# Patient Record
Sex: MALE | Race: WHITE | HISPANIC OR LATINO | Employment: FULL TIME | URBAN - METROPOLITAN AREA
[De-identification: names, ages, dates, MRNs, and addresses within clinical notes are randomized per-mention and may not be internally consistent; named-entity substitution may affect disease eponyms.]

---

## 2018-09-05 ENCOUNTER — APPOINTMENT (OUTPATIENT)
Dept: RADIOLOGY | Facility: CLINIC | Age: 38
End: 2018-09-05

## 2018-09-05 ENCOUNTER — OFFICE VISIT (OUTPATIENT)
Dept: URGENT CARE | Facility: CLINIC | Age: 38
End: 2018-09-05

## 2018-09-05 VITALS
SYSTOLIC BLOOD PRESSURE: 134 MMHG | OXYGEN SATURATION: 98 % | HEART RATE: 67 BPM | TEMPERATURE: 98.9 F | HEIGHT: 69 IN | RESPIRATION RATE: 18 BRPM | DIASTOLIC BLOOD PRESSURE: 66 MMHG | BODY MASS INDEX: 24.44 KG/M2 | WEIGHT: 165 LBS

## 2018-09-05 DIAGNOSIS — S46.912A SHOULDER STRAIN, LEFT, INITIAL ENCOUNTER: ICD-10-CM

## 2018-09-05 DIAGNOSIS — M25.512 ACUTE PAIN OF LEFT SHOULDER: Primary | ICD-10-CM

## 2018-09-05 DIAGNOSIS — M25.512 ACUTE PAIN OF LEFT SHOULDER: ICD-10-CM

## 2018-09-05 PROCEDURE — 73030 X-RAY EXAM OF SHOULDER: CPT

## 2018-09-05 PROCEDURE — G0382 LEV 3 HOSP TYPE B ED VISIT: HCPCS | Performed by: EMERGENCY MEDICINE

## 2018-09-05 RX ORDER — ALBUTEROL SULFATE 90 UG/1
2 AEROSOL, METERED RESPIRATORY (INHALATION) EVERY 6 HOURS PRN
COMMUNITY

## 2018-09-05 RX ORDER — CYCLOBENZAPRINE HCL 10 MG
10 TABLET ORAL 3 TIMES DAILY PRN
Qty: 30 TABLET | Refills: 0 | Status: SHIPPED | OUTPATIENT
Start: 2018-09-05 | End: 2022-03-10

## 2018-09-05 RX ORDER — PREDNISONE 10 MG/1
TABLET ORAL
Qty: 27 TABLET | Refills: 0 | Status: SHIPPED | OUTPATIENT
Start: 2018-09-05

## 2018-09-05 NOTE — PATIENT INSTRUCTIONS
Gentle stretching as demonstrated, gradually progress stretching frequency and intensity  Use heat as discussed before stretching but may also use ice for reducing inflammation in between stretching sections  Rotator Cuff Injury   WHAT YOU NEED TO KNOW:   A rotator cuff injury is damage to the muscles or tendons of your rotator cuff  The rotator cuff is made up of a group of muscles and tendons that hold the shoulder joint in place  The damage may include stretching of your muscle or tears in the tendons  It may also include inflammation of the bursa (small sack of fluid around the joint)  DISCHARGE INSTRUCTIONS:   Medicines:   · Acetaminophen: This medicine decreases pain  Acetaminophen is available without a doctor's order  Ask how much to take and how often to take it  Follow directions  Acetaminophen can cause liver damage if not taken correctly  · NSAIDs:  These medicines decrease swelling, pain, and fever  NSAIDs are available without a doctor's order  Ask your healthcare provider which medicine is right for you  Ask how much to take and when to take it  Take as directed  NSAIDs can cause stomach bleeding or kidney problems if not taken correctly  · Pain medicine: You may be given a prescription medicine to decrease pain  Do not wait until the pain is severe before you take this medicine  · Steroids: This medicine may be injected into the rotator cuff area to decrease inflammation and pain  · Take your medicine as directed  Contact your healthcare provider if you think your medicine is not helping or if you have side effects  Tell him or her if you are allergic to any medicine  Keep a list of the medicines, vitamins, and herbs you take  Include the amounts, and when and why you take them  Bring the list or the pill bottles to follow-up visits  Carry your medicine list with you in case of an emergency    Follow up with your healthcare provider or orthopedist as directed:  Write down your questions so you remember to ask them during your visits  Physical therapy:  A physical therapist can teach you exercises to help improve movement and strength, and to decrease pain  These exercises may help you go back to your usual activities or return to playing sports  Self-care:   · Rest:  Rest may help your shoulder heal  Overuse of your shoulder can make your injury worse  Avoid heavy lifting, using your arms over your head, or any other activity that makes the pain worse  · Put ice or heat on your shoulder:  Use ice on your shoulder every few hours for the first several days  This may help decrease pain and swelling  After the first several days, a heating pad may help relax the muscles in your shoulder  Contact your healthcare provider or orthopedist if:   · The pain in your shoulder or arm is not improving, or is worse than before you started treatment  · You have new pain in your neck  · You have a fever  · You have questions or concerns about your condition or care  Seek care immediately or call 911 if:  · You suddenly cannot move your arm  · You have severe stomach or back pain, are vomiting blood, or have black bowel movements  © 2017 [facility] Information is for End User's use only and may not be sold, redistributed or otherwise used for commercial purposes  All illustrations and images included in CareNotes® are the copyrighted property of [employer] D A M [employer] Inc  or Andrea Jiménez  The above information is an  only  It is not intended as medical advice for individual conditions or treatments  Talk to your doctor, nurse or pharmacist before following any medical regimen to see if it is safe and effective for you

## 2018-09-05 NOTE — PROGRESS NOTES
[facility]        NAME: Tiffany Mae is a 44 y o  male  : 1980    MRN: [id number]  DATE: 2018  TIME: 12:23 PM    Assessment and Plan   Acute pain of left shoulder [M25 512]  1  Acute pain of left shoulder  XR shoulder 2+ vw left   2  Shoulder strain, left, initial encounter  cyclobenzaprine (FLEXERIL) 10 mg tablet    predniSONE 10 mg tablet    Ambulatory referral to Physical Therapy         Patient Instructions     Patient Instructions     Gentle stretching as demonstrated, gradually progress stretching frequency and intensity  Use heat as discussed before stretching but may also use ice for reducing inflammation in between stretching sections  Rotator Cuff Injury   WHAT YOU NEED TO KNOW:   A rotator cuff injury is damage to the muscles or tendons of your rotator cuff  The rotator cuff is made up of a group of muscles and tendons that hold the shoulder joint in place  The damage may include stretching of your muscle or tears in the tendons  It may also include inflammation of the bursa (small sack of fluid around the joint)  DISCHARGE INSTRUCTIONS:   Medicines:   · Acetaminophen: This medicine decreases pain  Acetaminophen is available without a doctor's order  Ask how much to take and how often to take it  Follow directions  Acetaminophen can cause liver damage if not taken correctly  · NSAIDs:  These medicines decrease swelling, pain, and fever  NSAIDs are available without a doctor's order  Ask your healthcare provider which medicine is right for you  Ask how much to take and when to take it  Take as directed  NSAIDs can cause stomach bleeding or kidney problems if not taken correctly  · Pain medicine: You may be given a prescription medicine to decrease pain  Do not wait until the pain is severe before you take this medicine  · Steroids: This medicine may be injected into the rotator cuff area to decrease inflammation and pain      · Take your medicine as directed  Contact your healthcare provider if you think your medicine is not helping or if you have side effects  Tell him or her if you are allergic to any medicine  Keep a list of the medicines, vitamins, and herbs you take  Include the amounts, and when and why you take them  Bring the list or the pill bottles to follow-up visits  Carry your medicine list with you in case of an emergency  Follow up with your healthcare provider or orthopedist as directed:  Write down your questions so you remember to ask them during your visits  Physical therapy:  A physical therapist can teach you exercises to help improve movement and strength, and to decrease pain  These exercises may help you go back to your usual activities or return to playing sports  Self-care:   · Rest:  Rest may help your shoulder heal  Overuse of your shoulder can make your injury worse  Avoid heavy lifting, using your arms over your head, or any other activity that makes the pain worse  · Put ice or heat on your shoulder:  Use ice on your shoulder every few hours for the first several days  This may help decrease pain and swelling  After the first several days, a heating pad may help relax the muscles in your shoulder  Contact your healthcare provider or orthopedist if:   · The pain in your shoulder or arm is not improving, or is worse than before you started treatment  · You have new pain in your neck  · You have a fever  · You have questions or concerns about your condition or care  Seek care immediately or call 911 if:  · You suddenly cannot move your arm  · You have severe stomach or back pain, are vomiting blood, or have black bowel movements  © 2017 [facility] [facility] Information is for End User's use only and may not be sold, redistributed or otherwise used for commercial purposes  All illustrations and images included in CareNotes® are the copyrighted property of [employer]  or Andrea Jiménez    The above information is an  only  It is not intended as medical advice for individual conditions or treatments  Talk to your doctor, nurse or pharmacist before following any medical regimen to see if it is safe and effective for you  Follow up with PCP in 3-5 days  Proceed to  ER if symptoms worsen  Chief Complaint     Chief Complaint   Patient presents with    Arm Pain     c/o l arm pain  believes he slept on it wrong  History of Present Illness       He complains of awakening with pain and weakness in his left neck, arm and shoulder this morning  He denies any unusual activity yesterday denies trauma in recent days to his neck or shoulder  He admits to similar but less severe symptoms in the remote past   He believes he slept wrong on his left arm  Review of Systems   Review of Systems   Constitutional: Negative for chills and fever  Musculoskeletal: Positive for gait problem  Negative for arthralgias and joint swelling  Skin: Negative for color change, rash and wound  Neurological: Negative for numbness           Current Medications       Current Outpatient Prescriptions:     Acetaminophen (TYLENOL ARTHRITIS PAIN PO), Take by mouth, Disp: , Rfl:     albuterol (PROVENTIL HFA,VENTOLIN HFA) 90 mcg/act inhaler, Inhale 2 puffs every 6 (six) hours as needed for wheezing, Disp: , Rfl:     cyclobenzaprine (FLEXERIL) 10 mg tablet, Take 1 tablet (10 mg total) by mouth 3 (three) times a day as needed for muscle spasms, Disp: 30 tablet, Rfl: 0    predniSONE 10 mg tablet, Take once daily all days pills on this schedule 6- 6- 5- 4- 3- 2- 1, Disp: 27 tablet, Rfl: 0    Current Allergies     Allergies as of 09/05/2018 - Reviewed 09/05/2018   Allergen Reaction Noted    Cat hair extract  09/05/2018            The following portions of the patient's history were reviewed and updated as appropriate: allergies, current medications, past family history, past medical history, past social history, past surgical history and problem list      Past Medical History:   Diagnosis Date    Asthma     Heat stroke        Past Surgical History:   Procedure Laterality Date    ANKLE SURGERY Left        No family history on file  Medications have been verified  Objective   /66   Pulse 67   Temp 98 9 °F (37 2 °C) (Tympanic)   Resp 18   Ht 5' 9" (1 753 m)   Wt 74 8 kg (165 lb)   SpO2 98%   BMI 24 37 kg/m²        Physical Exam     Physical Exam   Constitutional: He is oriented to person, place, and time  He appears well-developed and well-nourished  No distress  Neck: Neck supple  Cardiovascular: Normal rate and regular rhythm  Pulmonary/Chest: Effort normal and breath sounds normal    Musculoskeletal: He exhibits tenderness  Tender at paravertebral muscles left C-spine, flexion left shoulder to 90° limited due to pain abduction left shoulder 90° limited due to pain internal and external rotation full but painful tender at biceps tendon origin left and tender as supraspinatus insertion left   Neurological: He is alert and oriented to person, place, and time  Skin: Skin is warm and dry  No rash noted  No erythema  Nursing note and vitals reviewed

## 2021-05-11 ENCOUNTER — OFFICE VISIT (OUTPATIENT)
Dept: URGENT CARE | Facility: CLINIC | Age: 41
End: 2021-05-11
Payer: COMMERCIAL

## 2021-05-11 VITALS
HEART RATE: 78 BPM | HEIGHT: 69 IN | BODY MASS INDEX: 25.18 KG/M2 | WEIGHT: 170 LBS | SYSTOLIC BLOOD PRESSURE: 116 MMHG | OXYGEN SATURATION: 97 % | DIASTOLIC BLOOD PRESSURE: 58 MMHG | RESPIRATION RATE: 18 BRPM

## 2021-05-11 DIAGNOSIS — M54.41 ACUTE RIGHT-SIDED LOW BACK PAIN WITH RIGHT-SIDED SCIATICA: Primary | ICD-10-CM

## 2021-05-11 PROCEDURE — G0382 LEV 3 HOSP TYPE B ED VISIT: HCPCS | Performed by: PHYSICIAN ASSISTANT

## 2021-05-11 RX ORDER — PREDNISONE 50 MG/1
50 TABLET ORAL DAILY
Qty: 5 TABLET | Refills: 0 | Status: SHIPPED | OUTPATIENT
Start: 2021-05-11 | End: 2021-05-16

## 2021-05-11 RX ORDER — NAPROXEN 500 MG/1
500 TABLET ORAL 2 TIMES DAILY WITH MEALS
Qty: 30 TABLET | Refills: 0 | Status: SHIPPED | OUTPATIENT
Start: 2021-05-11 | End: 2022-03-10

## 2021-05-11 RX ORDER — METHOCARBAMOL 750 MG/1
750 TABLET, FILM COATED ORAL EVERY 6 HOURS PRN
Qty: 15 TABLET | Refills: 0 | Status: SHIPPED | OUTPATIENT
Start: 2021-05-11 | End: 2022-03-10

## 2021-05-11 NOTE — PROGRESS NOTES
[facility]        NAME: Serafin Collazo is a 38 y o  male  : 1980    MRN: [id number]  DATE: May 11, 2021  TIME: 9:22 AM    Assessment and Plan   Acute right-sided low back pain with right-sided sciatica [M54 41]  1  Acute right-sided low back pain with right-sided sciatica  predniSONE 50 mg tablet    methocarbamol (ROBAXIN) 750 mg tablet    naproxen (NAPROSYN) 500 mg tablet    Ambulatory Referral to [facility]         Patient Instructions      take prednisone as directed until completed   use additional medications as directed for pain relief   May use additional Tylenol as needed for pain relief   follow-up with comprehensive spine as needed  Follow up with PCP in 3-5 days  Proceed to  ER if symptoms worsen  Chief Complaint     Chief Complaint   Patient presents with    Back Pain     pain starting at his lower back and going down his right leg started          History of Present Illness        21-year-old male presents with low back pain  Patient reports symptoms started on  has been waxing waning since then  No injuries reported  Has had similar issues before with low back pain that radiated into his leg  Denies any traumas to the area  Patient reports that he is woke up on  morning with low back pain it started radiating down into his right leg  Denies any bowel or bladder incontinence  No abdominal pain nausea vomiting or diarrhea  No urinary frequency urgency burning with urination  No chest pain shortness of breath or cough  Denies any fevers or chills  Back Pain  This is a new problem  The current episode started in the past 7 days  The problem occurs constantly  The problem has been waxing and waning since onset  The pain is present in the lumbar spine and gluteal  The quality of the pain is described as shooting and stabbing  The pain radiates to the right thigh, right knee and right foot  The pain is moderate   The pain is the same all the time  The symptoms are aggravated by bending, position and twisting  Stiffness is present in the morning  Associated symptoms include leg pain  Pertinent negatives include no abdominal pain, bladder incontinence, bowel incontinence, chest pain, dysuria, numbness, paresis, perianal numbness, tingling or weakness  He has tried NSAIDs for the symptoms  The treatment provided no relief  Review of Systems   Review of Systems   Constitutional: Negative  HENT: Negative  Eyes: Negative  Respiratory: Negative  Cardiovascular: Negative  Negative for chest pain  Gastrointestinal: Negative  Negative for abdominal pain and bowel incontinence  Genitourinary: Negative for bladder incontinence and dysuria  Musculoskeletal: Positive for back pain  Skin: Negative  Neurological: Negative  Negative for tingling, weakness and numbness           Current Medications       Current Outpatient Medications:     Acetaminophen (TYLENOL ARTHRITIS PAIN PO), Take by mouth, Disp: , Rfl:     albuterol (PROVENTIL HFA,VENTOLIN HFA) 90 mcg/act inhaler, Inhale 2 puffs every 6 (six) hours as needed for wheezing, Disp: , Rfl:     cyclobenzaprine (FLEXERIL) 10 mg tablet, Take 1 tablet (10 mg total) by mouth 3 (three) times a day as needed for muscle spasms (Patient not taking: Reported on 5/11/2021), Disp: 30 tablet, Rfl: 0    methocarbamol (ROBAXIN) 750 mg tablet, Take 1 tablet (750 mg total) by mouth every 6 (six) hours as needed for muscle spasms, Disp: 15 tablet, Rfl: 0    naproxen (NAPROSYN) 500 mg tablet, Take 1 tablet (500 mg total) by mouth 2 (two) times a day with meals, Disp: 30 tablet, Rfl: 0    predniSONE 10 mg tablet, Take once daily all days pills on this schedule 6- 6- 5- 4- 3- 2- 1 (Patient not taking: Reported on 5/11/2021), Disp: 27 tablet, Rfl: 0    predniSONE 50 mg tablet, Take 1 tablet (50 mg total) by mouth daily for 5 days, Disp: 5 tablet, Rfl: 0    Current Allergies     Allergies as of 05/11/2021 - Reviewed 05/11/2021   Allergen Reaction Noted    Cat hair extract  09/05/2018            The following portions of the patient's history were reviewed and updated as appropriate: allergies, current medications, past family history, past medical history, past social history, past surgical history and problem list      Past Medical History:   Diagnosis Date    Asthma     Heat stroke        Past Surgical History:   Procedure Laterality Date    ANKLE SURGERY Left        History reviewed  No pertinent family history  Medications have been verified  Objective   /58   Pulse 78   Resp 18   Ht 5' 9" (1 753 m)   Wt 77 1 kg (170 lb)   SpO2 97%   BMI 25 10 kg/m²   No LMP for male patient  Physical Exam     Physical Exam  Vitals signs and nursing note reviewed  Constitutional:       General: He is not in acute distress  Appearance: He is well-developed  HENT:      Head: Normocephalic and atraumatic  Right Ear: External ear normal       Left Ear: External ear normal       Nose: Nose normal    Eyes:      General:         Right eye: No discharge  Left eye: No discharge  Conjunctiva/sclera: Conjunctivae normal    Neck:      Musculoskeletal: Normal range of motion and neck supple  Cardiovascular:      Rate and Rhythm: Normal rate and regular rhythm  Pulmonary:      Effort: Pulmonary effort is normal  No respiratory distress  Musculoskeletal: Normal range of motion  Lumbar back: He exhibits tenderness, pain and spasm  He exhibits normal range of motion, no bony tenderness, no swelling, no edema, no deformity, no laceration and normal pulse  Back:    Skin:     General: Skin is warm and dry  Neurological:      Mental Status: He is alert and oriented to person, place, and time  GCS: GCS eye subscore is 4  GCS verbal subscore is 5  GCS motor subscore is 6  Motor: Motor function is intact  Coordination: Coordination is intact  Deep Tendon Reflexes:      Reflex Scores:       Patellar reflexes are 2+ on the right side and 2+ on the left side

## 2021-05-11 NOTE — PATIENT INSTRUCTIONS
take prednisone as directed until completed   use additional medications as directed for pain relief   May use additional Tylenol as needed for pain relief   follow-up with comprehensive spine as needed  Follow up with PCP in 3-5 days  Proceed to  ER if symptoms worsen  Acute Low Back Pain   AMBULATORY CARE:   Acute low back pain  is sudden discomfort in your lower back area that lasts for up to 6 weeks  The discomfort makes it difficult to tolerate activity  Common symptoms include the following:   · Back stiffness or spasms    · Pain down the back or side of one leg    · Holding yourself in an unusual position or posture to decrease your back pain    · Not being able to find a sitting position that is comfortable    · Slow increase in your pain for 24 to 48 hours after you stress your back    · Tenderness on your lower back or severe pain when you move your back    Seek care immediately if:   · You have severe pain  · You have sudden stiffness and heaviness on both buttocks down to both legs  · You have numbness or weakness in one leg, or pain in both legs  · You have numbness in your genital area or across your lower back  · You cannot control your urine or bowel movements  Contact your healthcare provider if:   · You have a fever  · You have pain at night or when you rest     · Your pain does not get better with treatment  · You have pain that worsens when you cough or sneeze  · You suddenly feel something pop or snap in your back  · You have questions or concerns about your condition or care  The goal of treatment for acute low back pain  is to relieve your pain and help you tolerate activity  Most people with acute lower back pain get better within 4 to 6 weeks  You may need any of the following:  · NSAIDs  help decrease swelling and pain  This medicine is available with or without a doctor's order  NSAIDs can cause stomach bleeding or kidney problems in certain people   If you take blood thinner medicine, always ask your healthcare provider if NSAIDs are safe for you  Always read the medicine label and follow directions  · Acetaminophen  decreases pain and fever  It is available without a doctor's order  Ask how much to take and how often to take it  Follow directions  Read the labels of all other medicines you are using to see if they also contain acetaminophen, or ask your doctor or pharmacist  Acetaminophen can cause liver damage if not taken correctly  Do not use more than 4 grams (4,000 milligrams) total of acetaminophen in one day  · Muscle relaxers  decrease pain by relaxing the muscles in your lower spine  · Prescription pain medicine  may be given  Ask your healthcare provider how to take this medicine safely  Some prescription pain medicines contain acetaminophen  Do not take other medicines that contain acetaminophen without talking to your healthcare provider  Too much acetaminophen may cause liver damage  Prescription pain medicine may cause constipation  Ask your healthcare provider how to prevent or treat constipation  Manage your symptoms:   · Stay active  as much as you can without causing more pain  Bed rest could make your back pain worse  Start with some light exercises such as walking  Avoid heavy lifting until your pain is gone  Ask for more information about the activities or exercises that are right for you  · Apply ice  on your back for 15 to 20 minutes every hour or as directed  Use an ice pack, or put crushed ice in a plastic bag  Cover it with a towel before you apply it to your skin  Ice helps prevent tissue damage and decreases swelling and pain  · Apply heat  on your back for 20 to 30 minutes every 2 hours for as many days as directed  Heat helps decrease pain and muscle spasms  Alternate heat and ice  Prevent acute low back pain:   · Use proper body mechanics  ? Bend at the hips and knees when you  objects   Do not bend from the waist  Use your leg muscles as you lift the load  Do not use your back  Keep the object close to your chest as you lift it  Try not to twist or lift anything above your waist     ? Change your position often when you stand for long periods of time  Rest one foot on a small box or footrest, and then switch to the other foot often  ? Try not to sit for long periods of time  When you do, sit in a straight-backed chair with your feet flat on the floor  Never reach, pull, or push while you are sitting  · Do exercises that strengthen your back muscles  Warm up before you exercise  Ask your healthcare provider the best exercises for you  · Maintain a healthy weight  Ask your healthcare provider how much you should weigh  Ask him to help you create a weight loss plan if you are overweight  Follow up with your healthcare provider as directed:  Return for a follow-up visit if you still have pain after 1 to 3 weeks of treatment  You may need to visit an orthopedist if your back pain lasts longer than 12 weeks  Write down your questions so you remember to ask them during your visits  © Copyright [address] Information is for End User's use only and may not be sold, redistributed or otherwise used for commercial purposes  All illustrations and images included in CareNotes® are the copyrighted property of A D A M , Inc  or [facility] Taye Mcdermott   The above information is an  only  It is not intended as medical advice for individual conditions or treatments  Talk to your doctor, nurse or pharmacist before following any medical regimen to see if it is safe and effective for you

## 2021-05-11 NOTE — LETTER
May 11, 2021     Patient: Gerardo Padgett   YOB: 1980   Date of Visit: 5/11/2021       To Whom it May Concern:    Gerardo Padgett was seen in my clinic on 5/11/2021  He may return to work on 05/14/2021  Patient may return sooner if symptoms have improved  If you have any questions or concerns, please don't hesitate to call  Sincerely,          Washington German PA-C        CC: No Recipients

## 2021-05-12 ENCOUNTER — TELEPHONE (OUTPATIENT)
Dept: PHYSICAL THERAPY | Facility: OTHER | Age: 41
End: 2021-05-12

## 2021-05-12 NOTE — TELEPHONE ENCOUNTER
Call placed to the patient per [facility] referral     Voice message left for patient to call back  Phone number and hours of business provided  This is the 1st attempt to reach the patient    Will defer per protocol     ? H I   ? [facility]

## 2022-03-10 ENCOUNTER — OFFICE VISIT (OUTPATIENT)
Dept: URGENT CARE | Facility: CLINIC | Age: 42
End: 2022-03-10
Payer: COMMERCIAL

## 2022-03-10 VITALS
SYSTOLIC BLOOD PRESSURE: 124 MMHG | OXYGEN SATURATION: 99 % | HEART RATE: 72 BPM | TEMPERATURE: 98 F | RESPIRATION RATE: 16 BRPM | DIASTOLIC BLOOD PRESSURE: 74 MMHG

## 2022-03-10 DIAGNOSIS — G89.29 CHRONIC MIDLINE LOW BACK PAIN WITHOUT SCIATICA: Primary | ICD-10-CM

## 2022-03-10 DIAGNOSIS — M54.50 CHRONIC MIDLINE LOW BACK PAIN WITHOUT SCIATICA: Primary | ICD-10-CM

## 2022-03-10 PROCEDURE — G0382 LEV 3 HOSP TYPE B ED VISIT: HCPCS | Performed by: PHYSICIAN ASSISTANT

## 2022-03-10 RX ORDER — IBUPROFEN 600 MG/1
600 TABLET ORAL EVERY 6 HOURS PRN
COMMUNITY

## 2022-03-10 RX ORDER — KETOROLAC TROMETHAMINE 30 MG/ML
60 INJECTION, SOLUTION INTRAMUSCULAR; INTRAVENOUS ONCE
Status: DISCONTINUED | OUTPATIENT
Start: 2022-03-10 | End: 2022-03-10

## 2022-03-10 RX ORDER — METHOCARBAMOL 750 MG/1
750 TABLET, FILM COATED ORAL EVERY 6 HOURS PRN
Qty: 30 TABLET | Refills: 0 | Status: SHIPPED | OUTPATIENT
Start: 2022-03-10

## 2022-03-10 RX ORDER — IBUPROFEN 400 MG/1
800 TABLET ORAL ONCE
Status: COMPLETED | OUTPATIENT
Start: 2022-03-10 | End: 2022-03-10

## 2022-03-10 RX ADMIN — IBUPROFEN 800 MG: 400 TABLET ORAL at 11:03

## 2022-03-10 NOTE — PATIENT INSTRUCTIONS
Full labs done 7/2021 from Dr. Lesly Ramirez  Please advise if further labs needed prior to OV Medications as prescribed  Alternate ice and heat  Gentle range-of-motion stretching  Remain active  Make appoint with comprehensive spine program   Follow-up with family doctor in 3-5 days  Go to ER symptoms become severe  Back Pain   WHAT YOU NEED TO KNOW:   Back pain is common  You may have back pain and muscle spasms  You may feel sore or stiff on one or both sides of your back  The pain may spread to your lower body  Conditions that affect the spine, joints, or muscles can cause back pain  These may include arthritis, spinal stenosis (narrowing of the spinal column), muscle tension, or breakdown of the spinal discs  DISCHARGE INSTRUCTIONS:   Call your local emergency number (911 in the [address] [facility]) if:   · You have severe back pain with chest pain  · You cannot control your urine or bowel movements  · Your pain becomes so severe that you cannot walk  Return to the emergency department if:   · You have pain, numbness, or weakness in one or both legs  · You have severe back pain, nausea, and vomiting  · You have severe back pain that spreads to your side or genital area  Call your doctor if:   · You have back pain that does not get better with rest and pain medicine  · You have a fever  · You have pain that worsens when you are on your back or when you rest     · You have pain that worsens when you cough or sneeze  · You lose weight without trying  · You have questions or concerns about your condition or care  Medicines: You may need any of the following:  · NSAIDs , such as ibuprofen, help decrease swelling, pain, and fever  This medicine is available with or without a doctor's order  NSAIDs can cause stomach bleeding or kidney problems in certain people  If you take blood thinner medicine, always ask your healthcare provider if NSAIDs are safe for you  Always read the medicine label and follow directions  · Acetaminophen  decreases pain and fever   It is available without a doctor's order  Ask how much to take and how often to take it  Follow directions  Read the labels of all other medicines you are using to see if they also contain acetaminophen, or ask your doctor or pharmacist  Acetaminophen can cause liver damage if not taken correctly  Do not use more than 4 grams (4,000 milligrams) total of acetaminophen in one day  · Muscle relaxers  help decrease muscle spasms and back pain  · Prescription pain medicine  may be given  Ask your healthcare provider how to take this medicine safely  Some prescription pain medicines contain acetaminophen  Do not take other medicines that contain acetaminophen without talking to your healthcare provider  Too much acetaminophen may cause liver damage  Prescription pain medicine may cause constipation  Ask your healthcare provider how to prevent or treat constipation  · Take your medicine as directed  Contact your healthcare provider if you think your medicine is not helping or if you have side effects  Tell him or her if you are allergic to any medicine  Keep a list of the medicines, vitamins, and herbs you take  Include the amounts, and when and why you take them  Bring the list or the pill bottles to follow-up visits  Carry your medicine list with you in case of an emergency  How to manage your back pain:   · Apply ice  on your back for 15 to 20 minutes every hour or as directed  Use an ice pack, or put crushed ice in a plastic bag  Cover it with a towel before you apply it to your skin  Ice helps prevent tissue damage and decreases pain  · Apply heat  on your back for 20 to 30 minutes every 2 hours for as many days as directed  Heat helps decrease pain and muscle spasms  · Stay active  as much as you can without causing more pain  Bed rest could make your back pain worse  Avoid heavy lifting until your pain is gone  · Go to physical therapy as directed    A physical therapist can teach you exercises to help improve movement and strength, and to decrease pain  Follow up with your doctor in 2 weeks, or as directed: You might need to see a specialist  Write down your questions so you remember to ask them during your visits  © Copyright [employer] 2022 Information is for End User's use only and may not be sold, redistributed or otherwise used for commercial purposes  All illustrations and images included in CareNotes® are the copyrighted property of A D A M [employer] Inc  or [employer]  The above information is an  only  It is not intended as medical advice for individual conditions or treatments  Talk to your doctor, nurse or pharmacist before following any medical regimen to see if it is safe and effective for you

## 2022-03-10 NOTE — LETTER
March 10, 2022     Patient: Rosario Das   YOB: 1980   Date of Visit: 3/10/2022       To Whom It May Concern: It is my medical opinion that Rosario Das may return to work on 3/14/2022             Sincerely,        Mariia Becerra PA-C
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
## 2022-03-11 ENCOUNTER — NURSE TRIAGE (OUTPATIENT)
Dept: PHYSICAL THERAPY | Facility: OTHER | Age: 42
End: 2022-03-11

## 2022-03-11 DIAGNOSIS — G89.29 CHRONIC MIDLINE LOW BACK PAIN WITHOUT SCIATICA: Primary | ICD-10-CM

## 2022-03-11 DIAGNOSIS — M54.50 CHRONIC MIDLINE LOW BACK PAIN WITHOUT SCIATICA: Primary | ICD-10-CM

## 2022-12-10 ENCOUNTER — OFFICE VISIT (OUTPATIENT)
Dept: URGENT CARE | Facility: CLINIC | Age: 42
End: 2022-12-10

## 2022-12-10 VITALS
SYSTOLIC BLOOD PRESSURE: 135 MMHG | TEMPERATURE: 97.1 F | HEART RATE: 92 BPM | WEIGHT: 175 LBS | HEIGHT: 69 IN | DIASTOLIC BLOOD PRESSURE: 67 MMHG | RESPIRATION RATE: 20 BRPM | BODY MASS INDEX: 25.92 KG/M2 | OXYGEN SATURATION: 99 %

## 2022-12-10 DIAGNOSIS — G89.29 CHRONIC MIDLINE LOW BACK PAIN WITHOUT SCIATICA: ICD-10-CM

## 2022-12-10 DIAGNOSIS — M54.50 CHRONIC MIDLINE LOW BACK PAIN WITHOUT SCIATICA: ICD-10-CM

## 2022-12-10 RX ORDER — METHOCARBAMOL 750 MG/1
750 TABLET, FILM COATED ORAL EVERY 6 HOURS PRN
Qty: 30 TABLET | Refills: 0 | Status: SHIPPED | OUTPATIENT
Start: 2022-12-10

## 2022-12-10 RX ORDER — DICLOFENAC SODIUM 75 MG/1
75 TABLET, DELAYED RELEASE ORAL 2 TIMES DAILY
Qty: 30 TABLET | Refills: 0 | Status: SHIPPED | OUTPATIENT
Start: 2022-12-10
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
## 2022-12-10 NOTE — LETTER
December 10, 2022     Patient: Sharita Echeverria  YOB: 1980  Date of Visit: 12/10/2022      To Whom it May Concern:    Sharita Echeverria is under my professional care  Prieto Stuart was seen in my office on 12/10/2022  Prieto Stuart may return to work on Wednesday, December 14, 2022  If you have any questions or concerns, please don't hesitate to call           Sincerely,          Alisha Lynn DO        CC: No Recipients

## 2023-05-11 ENCOUNTER — OFFICE VISIT (OUTPATIENT)
Dept: URGENT CARE | Facility: CLINIC | Age: 43
End: 2023-05-11

## 2023-05-11 VITALS
WEIGHT: 175 LBS | HEART RATE: 87 BPM | RESPIRATION RATE: 16 BRPM | TEMPERATURE: 97.8 F | DIASTOLIC BLOOD PRESSURE: 83 MMHG | BODY MASS INDEX: 25.92 KG/M2 | HEIGHT: 69 IN | OXYGEN SATURATION: 98 % | SYSTOLIC BLOOD PRESSURE: 125 MMHG

## 2023-05-11 DIAGNOSIS — R11.11 VOMITING WITHOUT NAUSEA, UNSPECIFIED VOMITING TYPE: Primary | ICD-10-CM

## 2023-05-11 RX ORDER — ONDANSETRON 4 MG/1
4 TABLET, FILM COATED ORAL EVERY 8 HOURS PRN
Qty: 20 TABLET | Refills: 0 | Status: SHIPPED | OUTPATIENT
Start: 2023-05-11

## 2023-05-11 NOTE — LETTER
May 11, 2023     Patient: Rochelle Garcia   YOB: 1980   Date of Visit: 5/11/2023       To Whom it May Concern:    Rochelle Garcia was seen in my clinic on 5/11/2023  Please excuse from work on 5/10/2023 and 5/11/2023  If you have any questions or concerns, please don't hesitate to call           Sincerely,          RICHARD Talavera        CC: No Recipients
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
## 2024-04-26 ENCOUNTER — OFFICE VISIT (OUTPATIENT)
Dept: URGENT CARE | Facility: CLINIC | Age: 44
End: 2024-04-26
Payer: COMMERCIAL

## 2024-04-26 VITALS
SYSTOLIC BLOOD PRESSURE: 138 MMHG | HEART RATE: 97 BPM | RESPIRATION RATE: 18 BRPM | WEIGHT: 191 LBS | DIASTOLIC BLOOD PRESSURE: 80 MMHG | OXYGEN SATURATION: 95 % | TEMPERATURE: 97.9 F | BODY MASS INDEX: 28.29 KG/M2 | HEIGHT: 69 IN

## 2024-04-26 DIAGNOSIS — F41.9 ANXIETY: Primary | ICD-10-CM

## 2024-04-26 PROCEDURE — G0382 LEV 3 HOSP TYPE B ED VISIT: HCPCS

## 2024-04-26 PROCEDURE — 93005 ELECTROCARDIOGRAM TRACING: CPT

## 2024-04-26 NOTE — PROGRESS NOTES
[facility]        NAME: Adam Brian is a 43 y.o. male  : 1980    MRN: [id number]  DATE: 2024  TIME: 7:09 PM    Assessment and Plan   Anxiety [F41.9]  1. Anxiety  ECG 12 lead    Ambulatory Referral to Family Practice        Symptoms resolved at present time. EKG showing NSR.  Symptoms appear to resemble anxiety secondary to wearing respirator.  Patient looking for documentation to possibly shorten length of time wearing this.  Recommend follow-up with PCP for this, referral placed. Encouraged continued supportive measures.  Follow up with PCP in 3-5 days or proceed to emergency department for worsening symptoms.  Patient verbalized understanding of instructions given.       Patient Instructions     Patient Instructions     Referral placed to Primary Care/Family Practice  Follow up with PCP in 3-5 days.  Proceed to  ER if symptoms worsen.    If tests have been performed at [facility], our office will contact you with results if changes need to be made to the care plan discussed with you at the visit.  You can review your full results on [facility] MyChart.    Anxiety   AMBULATORY CARE:   Anxiety  is a condition that causes you to feel extremely worried or nervous. The feelings are so strong that they can cause problems with your daily activities or sleep. Anxiety may be triggered by something you fear, or it may happen without a cause. Family or work stress, smoking, caffeine, and alcohol can increase your risk for anxiety. Certain medicines or health conditions can also increase your risk. Anxiety can become a long-term condition if it is not managed or treated.  Common signs and symptoms:   Fatigue or muscle tightness    Shaking, restlessness, or irritability    Problems focusing    Trouble sleeping    Feeling jumpy, easily startled, or dizzy    Rapid heartbeat or shortness of breath    Call your local emergency number (911 in the [place]) if:   You have chest pain, tightness, or  heaviness that may spread to your shoulders, arms, jaw, neck, or back.    You think about harming yourself or someone else.    Call your therapist or doctor if:   Your symptoms get worse or do not get better with treatment.    Your anxiety keeps you from doing your regular daily activities.    You have new symptoms since your last visit.    You have questions or concerns about your condition or care.    The following resources are available at any time to help you, if needed:   Contact a suicide prevention organization:        For the [employer] Suicide and Crisis Lifeline:     Call or text [employer]     Send a chat on https://[employer]/chat     Call 0-349-644-6905 (1-800-273-TALK)    For the Suicide Hotline, call 6-884-522-9359 (9-562-UJPTCLO)    For a list of international numbers: https://save.org/find-help/international-resources/  Treatment for anxiety  depends on how severe your symptoms are. The following are common treatments for anxiety:  Cognitive behavioral therapy (CBT)  teaches you how to identify and change negative thought patterns.    Anxiety or antidepressant medicine  may help relieve or prevent anxiety. You may need to take the medicine for several weeks before you begin to feel better. Tell your healthcare provider about any side effects or problems you have with your medicine. The type or amount of medicine may need to be changed. Medicines are usually used along with therapy.    Self-care:   Talk to someone about your anxiety.  Your healthcare provider may suggest counseling. You might feel more comfortable talking with a friend or family member about your anxiety. Choose someone you know will be supportive and encouraging.    Get regular physical activity.  Physical activity can lower your stress, improve your mood, and help you sleep better. Work with your healthcare provider to develop a plan that you enjoy.         Create a regular sleep schedule.  A routine can help you relax before bed. Listen  to music, read, or do yoga. Try to go to bed and wake up at the same time every day. Sleep is important for emotional health.    Find ways to relax.  Activities such as meditation or listening to music can help you relax. Spend time with friends, or do things you enjoy.    Do activities you enjoy.  Spend time with friends, or do something fun. Choose activities you are familiar with or comfortable doing. This may help prevent anxiety.    Practice deep breathing.  Deep breathing can help you relax when you feel anxious. Focus on taking slow, deep breaths several times a day, or during an anxiety attack. Breathe in through your nose and out through your mouth. Deep breathing combined with meditation or listening to music may help you feel calmer.    Do not have caffeine.  Caffeine can make your symptoms worse. Do not have foods or drinks that are meant to increase your energy level.    Do not drink alcohol or use drugs.  Alcohol and drugs can worsen anxiety or make it hard to manage. Talk to your therapist or healthcare provider if you need help to quit.       Follow up with your therapist or doctor as directed:  Your healthcare provider will monitor your progress at follow-up visits. Your provider will also monitor your medicine if you take antidepressants and ask if the medicine is helping. Tell your provider about any side effects or problems you have with your medicine. The type or amount of medicine may need to be changed. Write down your questions so you remember to ask them during your visits.  For more information or support:   [employer] [place] [employer] Mental [employer]ness  [address]  [place] [address]  Phone: 8- [phone] - [phone]  Phone: 5- 259 - [phone]  Web Address: http://www.mimi.org  [address] Suicide and Crisis Lifeline  [address]  [place] [address]  Phone: 3- 977 - 370  Web Address: http://www.suicidepreventionlifeline.org OR https://"[employer]org/chat/    © Copyright Merative 2023  "Information is for End User's use only and may not be sold, redistributed or otherwise used for commercial purposes.  The above information is an  only. It is not intended as medical advice for individual conditions or treatments. Talk to your doctor, nurse or pharmacist before following any medical regimen to see if it is safe and effective for you.      Chief Complaint     Chief Complaint   Patient presents with    Dizziness     Dizziness with chest pain from heat and sweating on mask  Needs note to go back to work          History of Present Illness       43-year-old male with a past medical history significant for asthma presents with complaints of episode of sudden onset chest pain as well as increased anxiety, dizziness, and diaphoresis approximately 30 minutes into his shift at work after having to wear a respirator.  Patient reports similar episodes previously as feels \"restrained\" with use of respirator but normally able to remove throughout shift.  Symptoms increase when in hot environments and does have to wear respiratory for approximately 8 hours per day. Reports today symptoms lasted approximately 1 hour and have now completely resolved.  Denies personal cardiac history.  No shortness of breath or palpitations. Started new job approx 2 months ago and is not used to wearing mask.     Dizziness  Associated symptoms include chest pain. Pertinent negatives include no abdominal pain, arthralgias, chills, congestion, coughing, fever, headaches, myalgias, nausea, numbness, rash, sore throat, vomiting or weakness.       Review of Systems   Review of Systems   Constitutional:  Negative for chills and fever.   HENT:  Negative for congestion, ear discharge, ear pain, rhinorrhea and sore throat.    Eyes:  Negative for discharge and visual disturbance.   Respiratory:  Negative for cough, shortness of breath and wheezing.    Cardiovascular:  Positive for chest pain.   Gastrointestinal:  Negative for " abdominal pain, diarrhea, nausea and vomiting.   Musculoskeletal:  Negative for arthralgias and myalgias.   Skin:  Negative for rash.   Neurological:  Positive for dizziness. Negative for syncope, weakness, numbness and headaches.   Psychiatric/Behavioral:  The patient is nervous/anxious.          Current Medications       Current Outpatient Medications:     Acetaminophen (TYLENOL ARTHRITIS PAIN PO), Take by mouth (Patient not taking: Reported on 5/11/2023), Disp: , Rfl:     albuterol (PROVENTIL HFA,VENTOLIN HFA) 90 mcg/act inhaler, Inhale 2 puffs every 6 (six) hours as needed for wheezing (Patient not taking: Reported on 5/11/2023), Disp: , Rfl:     diclofenac (VOLTAREN) 75 mg EC tablet, Take 1 tablet (75 mg total) by mouth 2 (two) times a day (Patient not taking: Reported on 4/26/2024), Disp: 30 tablet, Rfl: 0    ibuprofen (MOTRIN) 600 mg tablet, Take 600 mg by mouth every 6 (six) hours as needed (Patient not taking: Reported on 5/11/2023), Disp: , Rfl:     methocarbamol (Robaxin-750) 750 mg tablet, Take 1 tablet (750 mg total) by mouth every 6 (six) hours as needed for muscle spasms (Patient not taking: Reported on 5/11/2023), Disp: 30 tablet, Rfl: 0    naproxen (NAPROSYN) 500 mg tablet, Take 1 tablet by mouth 2 (two) times a day (Patient not taking: Reported on 5/11/2023), Disp: , Rfl:     ondansetron (ZOFRAN) 4 mg tablet, Take 1 tablet (4 mg total) by mouth every 8 (eight) hours as needed for nausea or vomiting (Patient not taking: Reported on 4/26/2024), Disp: 20 tablet, Rfl: 0    predniSONE 10 mg tablet, Take once daily all days pills on this schedule 6- 6- 5- 4- 3- 2- 1 (Patient not taking: Reported on 5/11/2021), Disp: 27 tablet, Rfl: 0    Current Allergies     Allergies as of 04/26/2024 - Reviewed 04/26/2024   Allergen Reaction Noted    Cat hair extract  09/05/2018            The following portions of the patient's history were reviewed and updated as appropriate: allergies, current medications, past  "family history, past medical history, past social history, past surgical history and problem list.     Past Medical History:   Diagnosis Date    Asthma     Heat stroke     Sciatica        Past Surgical History:   Procedure Laterality Date    ANKLE SURGERY Left        Family History   Problem Relation Age of Onset    Hyperlipidemia Mother     Heart disease Father          Medications have been verified.        Objective   /80   Pulse 97   Temp 97.9 °F (36.6 °C) (Tympanic)   Resp 18   Ht 5' 9\" (1.753 m)   Wt 86.6 kg (191 lb)   SpO2 95%   BMI 28.21 kg/m²   No LMP for male patient.       Physical Exam     Physical Exam  Vitals and nursing note reviewed.   Constitutional:       General: He is not in acute distress.     Appearance: He is not toxic-appearing.   HENT:      Head: Normocephalic.      Nose: Nose normal.      Mouth/Throat:      Mouth: Mucous membranes are moist.      Pharynx: Oropharynx is clear.   Eyes:      Conjunctiva/sclera: Conjunctivae normal.   Cardiovascular:      Rate and Rhythm: Normal rate and regular rhythm.      Heart sounds: Normal heart sounds.   Pulmonary:      Effort: Pulmonary effort is normal. No respiratory distress.      Breath sounds: Normal breath sounds. No stridor. No wheezing, rhonchi or rales.   Skin:     General: Skin is warm and dry.   Neurological:      Mental Status: He is alert and oriented to person, place, and time.      GCS: GCS eye subscore is 4. GCS verbal subscore is 5. GCS motor subscore is 6.      Sensory: Sensation is intact.      Motor: Motor function is intact.      Gait: Gait is intact.   Psychiatric:         Mood and Affect: Mood normal.         Behavior: Behavior normal.                   "

## 2024-04-26 NOTE — LETTER
April 26, 2024     Patient: Adam Brian   YOB: 1980   Date of Visit: 4/26/2024       To Whom it May Concern:    Adam Brian was seen in my clinic on 4/26/2024. He may return to work on 4/27/2024 .    If you have any questions or concerns, please don't hesitate to call.         Sincerely,          RICHARD Avelar        CC: No Recipients

## 2024-04-26 NOTE — PATIENT INSTRUCTIONS
Referral placed to Primary Care/Family Practice  Follow up with PCP in 3-5 days.  Proceed to  ER if symptoms worsen.    If tests have been performed at [facility], our office will contact you with results if changes need to be made to the care plan discussed with you at the visit.  You can review your full results on [facility] MyChart.    Anxiety   AMBULATORY CARE:   Anxiety  is a condition that causes you to feel extremely worried or nervous. The feelings are so strong that they can cause problems with your daily activities or sleep. Anxiety may be triggered by something you fear, or it may happen without a cause. Family or work stress, smoking, caffeine, and alcohol can increase your risk for anxiety. Certain medicines or health conditions can also increase your risk. Anxiety can become a long-term condition if it is not managed or treated.  Common signs and symptoms:   Fatigue or muscle tightness    Shaking, restlessness, or irritability    Problems focusing    Trouble sleeping    Feeling jumpy, easily startled, or dizzy    Rapid heartbeat or shortness of breath    Call your local emergency number (911 in the [place]) if:   You have chest pain, tightness, or heaviness that may spread to your shoulders, arms, jaw, neck, or back.    You think about harming yourself or someone else.    Call your therapist or doctor if:   Your symptoms get worse or do not get better with treatment.    Your anxiety keeps you from doing your regular daily activities.    You have new symptoms since your last visit.    You have questions or concerns about your condition or care.    The following resources are available at any time to help you, if needed:   Contact a suicide prevention organization:        For the [place] Suicide and Crisis Lifeline:     Call or text [employer]     Send a chat on https://[employer].org/chat     Call 8-089-628-8345 (1-800-273-TALK)    For the Suicide Hotline, call 1-169.836.2030 (6-059-PFDQNSD)    For a list of  international numbers: https://save.org/find-help/international-resources/  Treatment for anxiety  depends on how severe your symptoms are. The following are common treatments for anxiety:  Cognitive behavioral therapy (CBT)  teaches you how to identify and change negative thought patterns.    Anxiety or antidepressant medicine  may help relieve or prevent anxiety. You may need to take the medicine for several weeks before you begin to feel better. Tell your healthcare provider about any side effects or problems you have with your medicine. The type or amount of medicine may need to be changed. Medicines are usually used along with therapy.    Self-care:   Talk to someone about your anxiety.  Your healthcare provider may suggest counseling. You might feel more comfortable talking with a friend or family member about your anxiety. Choose someone you know will be supportive and encouraging.    Get regular physical activity.  Physical activity can lower your stress, improve your mood, and help you sleep better. Work with your healthcare provider to develop a plan that you enjoy.         Create a regular sleep schedule.  A routine can help you relax before bed. Listen to music, read, or do yoga. Try to go to bed and wake up at the same time every day. Sleep is important for emotional health.    Find ways to relax.  Activities such as meditation or listening to music can help you relax. Spend time with friends, or do things you enjoy.    Do activities you enjoy.  Spend time with friends, or do something fun. Choose activities you are familiar with or comfortable doing. This may help prevent anxiety.    Practice deep breathing.  Deep breathing can help you relax when you feel anxious. Focus on taking slow, deep breaths several times a day, or during an anxiety attack. Breathe in through your nose and out through your mouth. Deep breathing combined with meditation or listening to music may help you feel calmer.    Do not  have caffeine.  Caffeine can make your symptoms worse. Do not have foods or drinks that are meant to increase your energy level.    Do not drink alcohol or use drugs.  Alcohol and drugs can worsen anxiety or make it hard to manage. Talk to your therapist or healthcare provider if you need help to quit.       Follow up with your therapist or doctor as directed:  Your healthcare provider will monitor your progress at follow-up visits. Your provider will also monitor your medicine if you take antidepressants and ask if the medicine is helping. Tell your provider about any side effects or problems you have with your medicine. The type or amount of medicine may need to be changed. Write down your questions so you remember to ask them during your visits.  For more information or support:   [facility] [place] [facility]ness  [address]  [place] [address]  Phone: 1- 029 - [phone]  Phone: 0- 079 - [phone]  Web Address: http://www.mimi.[employer]  [facility] Suicide and Crisis Lifeline  [address]  [place] [address]  Phone: 1- 398 - 452  Web Address: http://www.suicidepreventionlifeline.org OR https://[employer]/chat/    © Copyright Merative 2023 Information is for End User's use only and may not be sold, redistributed or otherwise used for commercial purposes.  The above information is an  only. It is not intended as medical advice for individual conditions or treatments. Talk to your doctor, nurse or pharmacist before following any medical regimen to see if it is safe and effective for you.

## 2024-04-27 LAB
ATRIAL RATE: 77 BPM
P AXIS: 48 DEGREES
PR INTERVAL: 132 MS
QRS AXIS: 40 DEGREES
QRSD INTERVAL: 90 MS
QT INTERVAL: 384 MS
QTC INTERVAL: 434 MS
T WAVE AXIS: 46 DEGREES
VENTRICULAR RATE: 77 BPM

## 2024-04-27 PROCEDURE — 93010 ELECTROCARDIOGRAM REPORT: CPT | Performed by: INTERNAL MEDICINE

## 2024-04-30 ENCOUNTER — OFFICE VISIT (OUTPATIENT)
Dept: FAMILY MEDICINE CLINIC | Facility: CLINIC | Age: 44
End: 2024-04-30
Payer: COMMERCIAL

## 2024-04-30 VITALS
HEART RATE: 84 BPM | WEIGHT: 198.2 LBS | DIASTOLIC BLOOD PRESSURE: 82 MMHG | OXYGEN SATURATION: 98 % | SYSTOLIC BLOOD PRESSURE: 132 MMHG | HEIGHT: 69 IN | BODY MASS INDEX: 29.36 KG/M2

## 2024-04-30 DIAGNOSIS — F41.9 ANXIETY: ICD-10-CM

## 2024-04-30 DIAGNOSIS — Z00.00 ANNUAL PHYSICAL EXAM: Primary | ICD-10-CM

## 2024-04-30 DIAGNOSIS — R07.89 CHEST TIGHTNESS: ICD-10-CM

## 2024-04-30 PROCEDURE — 99386 PREV VISIT NEW AGE 40-64: CPT | Performed by: FAMILY MEDICINE

## 2024-04-30 PROCEDURE — 93000 ELECTROCARDIOGRAM COMPLETE: CPT | Performed by: FAMILY MEDICINE

## 2024-04-30 PROCEDURE — 99214 OFFICE O/P EST MOD 30 MIN: CPT | Performed by: FAMILY MEDICINE

## 2024-04-30 RX ORDER — ESCITALOPRAM OXALATE 10 MG/1
10 TABLET ORAL DAILY
Qty: 30 TABLET | Refills: 1 | Status: SHIPPED | OUTPATIENT
Start: 2024-04-30 | End: 2024-10-27

## 2024-04-30 NOTE — LETTER
April 30, 2024     Patient: Adam Brian  YOB: 1980  Date of Visit: 4/30/2024      To Whom it May Concern:    Adam Brian is under my professional care. Adam was seen in my office on 4/30/2024. Adam may return to work on 5/1/24 . Please excuse 4/29 and 4/30    If you have any questions or concerns, please don't hesitate to call.         Sincerely,          Robert Budinetz, MD        CC: No Recipients

## 2024-04-30 NOTE — PROGRESS NOTES
Assessment/Plan:       1. Annual physical exam  Assessment & Plan:  Reviewed age-appropriate health maintenance and preventive care.        2. Chest tightness  Comments:  ekg wnl  check stress test and labs  probably anxiety induced  Orders:  -     POCT ECG  -     escitalopram (LEXAPRO) 10 mg tablet; Take 1 tablet (10 mg total) by mouth daily  -     Stress test only, exercise; Future; Expected date: 04/30/2024  -     CBC and Platelet; Future  -     Comprehensive metabolic panel; Future  -     Lipid Panel With Direct LDL; Future    3. Anxiety  Comments:  will start lexapro  Orders:  -     Ambulatory Referral to Family Practice  -     escitalopram (LEXAPRO) 10 mg tablet; Take 1 tablet (10 mg total) by mouth daily  -     Stress test only, exercise; Future; Expected date: 04/30/2024  -     CBC and Platelet; Future  -     Comprehensive metabolic panel; Future  -     Lipid Panel With Direct LDL; Future          Subjective:      Patient ID: Adam Brian is a 43 y.o. male.    Patient is here for his initial visit.  He is a very healthy and pleasant 43-year-old man.  He has no chronic medical conditions.  He has been getting a tight feeling in the middle of his chest near his xiphoid process intermittently stress seems to make it worse.  Wearing a respirator at work is a problem and seems to cause.  He is going through a tough period.  He does have 2 sons.  Recently got  and his wife left about 6 months ago.  He lives by himself in [place] in an apartment.  He is doing okay but does have some symptoms of anxiety and depression without suicidal ideation.  I did an EKG in the office today which was within normal limits.  Working to start Lexapro and check blood work and doing a stress test.  He does have a family history of heart disease.  He is not overtly short of breath.  Chronically he does vape but does not smoke.  No drug use.  Minimal alcohol.  He understands importance of healthy diet and  "exercise.        The following portions of the patient's history were reviewed and updated as appropriate: allergies, current medications, past family history, past medical history, past social history, past surgical history, and problem list.    Review of Systems      Objective:      /82 (BP Location: Left arm, Patient Position: Sitting, Cuff Size: Standard)   Pulse 84   Ht 5' 9\" (1.753 m)   Wt 89.9 kg (198 lb 3.2 oz)   SpO2 98%   BMI 29.27 kg/m²          Physical Exam    "

## 2024-05-02 ENCOUNTER — APPOINTMENT (OUTPATIENT)
Dept: LAB | Facility: CLINIC | Age: 44
End: 2024-05-02
Payer: COMMERCIAL

## 2024-05-02 DIAGNOSIS — R07.89 CHEST TIGHTNESS: ICD-10-CM

## 2024-05-02 DIAGNOSIS — F41.9 ANXIETY: ICD-10-CM

## 2024-05-02 LAB
ALBUMIN SERPL BCP-MCNC: 4.1 G/DL (ref 3.5–5)
ALP SERPL-CCNC: 42 U/L (ref 34–104)
ALT SERPL W P-5'-P-CCNC: 27 U/L (ref 7–52)
ANION GAP SERPL CALCULATED.3IONS-SCNC: 6 MMOL/L (ref 4–13)
AST SERPL W P-5'-P-CCNC: 25 U/L (ref 13–39)
BILIRUB SERPL-MCNC: 0.61 MG/DL (ref 0.2–1)
BUN SERPL-MCNC: 11 MG/DL (ref 5–25)
CALCIUM SERPL-MCNC: 9.1 MG/DL (ref 8.4–10.2)
CHLORIDE SERPL-SCNC: 104 MMOL/L (ref 96–108)
CHOLEST SERPL-MCNC: 195 MG/DL
CO2 SERPL-SCNC: 30 MMOL/L (ref 21–32)
CREAT SERPL-MCNC: 1.08 MG/DL (ref 0.6–1.3)
ERYTHROCYTE [DISTWIDTH] IN BLOOD BY AUTOMATED COUNT: 12.5 % (ref 11.6–15.1)
GFR SERPL CREATININE-BSD FRML MDRD: 83 ML/MIN/1.73SQ M
GLUCOSE P FAST SERPL-MCNC: 91 MG/DL (ref 65–99)
HCT VFR BLD AUTO: 49.7 % (ref 36.5–49.3)
HDLC SERPL-MCNC: 54 MG/DL
HGB BLD-MCNC: 17 G/DL (ref 12–17)
LDLC SERPL CALC-MCNC: 103 MG/DL (ref 0–100)
MCH RBC QN AUTO: 31.2 PG (ref 26.8–34.3)
MCHC RBC AUTO-ENTMCNC: 34.2 G/DL (ref 31.4–37.4)
MCV RBC AUTO: 91 FL (ref 82–98)
PLATELET # BLD AUTO: 304 THOUSANDS/UL (ref 149–390)
PMV BLD AUTO: 9.9 FL (ref 8.9–12.7)
POTASSIUM SERPL-SCNC: 4.4 MMOL/L (ref 3.5–5.3)
PROT SERPL-MCNC: 6.9 G/DL (ref 6.4–8.4)
RBC # BLD AUTO: 5.45 MILLION/UL (ref 3.88–5.62)
SODIUM SERPL-SCNC: 140 MMOL/L (ref 135–147)
TRIGL SERPL-MCNC: 189 MG/DL
WBC # BLD AUTO: 7.78 THOUSAND/UL (ref 4.31–10.16)

## 2024-05-02 PROCEDURE — 80053 COMPREHEN METABOLIC PANEL: CPT

## 2024-05-02 PROCEDURE — 80061 LIPID PANEL: CPT

## 2024-05-02 PROCEDURE — 85027 COMPLETE CBC AUTOMATED: CPT

## 2024-05-02 PROCEDURE — 36415 COLL VENOUS BLD VENIPUNCTURE: CPT

## 2024-05-03 ENCOUNTER — TELEPHONE (OUTPATIENT)
Dept: FAMILY MEDICINE CLINIC | Facility: CLINIC | Age: 44
End: 2024-05-03

## 2024-05-03 NOTE — TELEPHONE ENCOUNTER
----- Message from Robert Budinetz, MD sent at 5/3/2024  6:18 AM EDT -----  Labs are good  No changes

## 2024-05-17 ENCOUNTER — TELEPHONE (OUTPATIENT)
Dept: FAMILY MEDICINE CLINIC | Facility: CLINIC | Age: 44
End: 2024-05-17

## 2024-05-17 ENCOUNTER — HOSPITAL ENCOUNTER (OUTPATIENT)
Dept: NON INVASIVE DIAGNOSTICS | Facility: HOSPITAL | Age: 44
Discharge: HOME | End: 2024-05-17
Attending: FAMILY MEDICINE
Payer: COMMERCIAL

## 2024-05-17 VITALS — WEIGHT: 198 LBS | HEIGHT: 69 IN | BODY MASS INDEX: 29.33 KG/M2

## 2024-05-17 DIAGNOSIS — F41.9 ANXIETY: ICD-10-CM

## 2024-05-17 DIAGNOSIS — R07.89 CHEST TIGHTNESS: ICD-10-CM

## 2024-05-17 LAB
CHEST PAIN STATEMENT: NORMAL
MAX DIASTOLIC BP: 68 MMHG
MAX HR PERCENT: 92 %
MAX HR: 162 BPM
MAX PREDICTED HEART RATE: 176 BPM
PROTOCOL NAME: NORMAL
RATE PRESSURE PRODUCT: NORMAL
SL CV STRESS RECOVERY BP: NORMAL MMHG
SL CV STRESS RECOVERY HR: 93 BPM
SL CV STRESS RECOVERY O2 SAT: 96 %
SL CV STRESS STAGE REACHED: 4
STRESS ANGINA INDEX: 0
STRESS BASELINE BP: NORMAL MMHG
STRESS BASELINE HR: 72 BPM
STRESS O2 SAT REST: 96 %
STRESS PEAK HR: 162 BPM
STRESS POST ESTIMATED WORKLOAD: 13.4 METS
STRESS POST EXERCISE DUR MIN: 10 MIN
STRESS POST EXERCISE DUR MIN: 10 MIN
STRESS POST EXERCISE DUR SEC: 0 SEC
STRESS POST O2 SAT PEAK: 95 %
STRESS POST PEAK BP: 170 MMHG
STRESS POST PEAK HR: 162 BPM
STRESS POST PEAK SYSTOLIC BP: 170 MMHG
TARGET HR FORMULA: NORMAL
TEST INDICATION: NORMAL

## 2024-05-17 PROCEDURE — 93017 CV STRESS TEST TRACING ONLY: CPT

## 2024-05-17 NOTE — TELEPHONE ENCOUNTER
----- Message from Robert Budinetz, MD sent at 5/17/2024 11:57 AM EDT -----  He passed his stress test fine

## 2024-05-27 LAB
CHEST PAIN STATEMENT: NORMAL
MAX DIASTOLIC BP: 68 MMHG
MAX PREDICTED HEART RATE: 176 BPM
PROTOCOL NAME: NORMAL
STRESS POST EXERCISE DUR MIN: 10 MIN
STRESS POST EXERCISE DUR SEC: 0 SEC
STRESS POST PEAK HR: 162 BPM
STRESS POST PEAK SYSTOLIC BP: 170 MMHG
TARGET HR FORMULA: NORMAL
TEST INDICATION: NORMAL

## 2024-06-09 ENCOUNTER — HOSPITAL ENCOUNTER (EMERGENCY)
Facility: HOSPITAL | Age: 44
Discharge: HOME | End: 2024-06-09
Attending: EMERGENCY MEDICINE
Payer: COMMERCIAL

## 2024-06-09 ENCOUNTER — APPOINTMENT (EMERGENCY)
Dept: RADIOLOGY | Facility: HOSPITAL | Age: 44
End: 2024-06-09
Payer: COMMERCIAL

## 2024-06-09 VITALS
DIASTOLIC BLOOD PRESSURE: 101 MMHG | SYSTOLIC BLOOD PRESSURE: 147 MMHG | HEART RATE: 75 BPM | OXYGEN SATURATION: 99 % | TEMPERATURE: 97.4 F | RESPIRATION RATE: 20 BRPM | BODY MASS INDEX: 28.05 KG/M2 | WEIGHT: 189.38 LBS | HEIGHT: 69 IN

## 2024-06-09 DIAGNOSIS — S16.1XXA STRAIN OF NECK MUSCLE, INITIAL ENCOUNTER: Primary | ICD-10-CM

## 2024-06-09 PROCEDURE — 99284 EMERGENCY DEPT VISIT MOD MDM: CPT | Performed by: EMERGENCY MEDICINE

## 2024-06-09 PROCEDURE — 99283 EMERGENCY DEPT VISIT LOW MDM: CPT

## 2024-06-09 PROCEDURE — 72040 X-RAY EXAM NECK SPINE 2-3 VW: CPT

## 2024-06-09 PROCEDURE — 93005 ELECTROCARDIOGRAM TRACING: CPT

## 2024-06-09 RX ORDER — PREDNISONE 20 MG/1
40 TABLET ORAL DAILY
Qty: 10 TABLET | Refills: 0 | Status: SHIPPED | OUTPATIENT
Start: 2024-06-09 | End: 2024-06-14

## 2024-06-09 RX ORDER — NAPROXEN 500 MG/1
500 TABLET ORAL 2 TIMES DAILY WITH MEALS
Qty: 30 TABLET | Refills: 0 | Status: SHIPPED | OUTPATIENT
Start: 2024-06-09

## 2024-06-09 RX ORDER — DIAZEPAM 5 MG/1
5 TABLET ORAL EVERY 12 HOURS PRN
Qty: 10 TABLET | Refills: 0 | Status: SHIPPED | OUTPATIENT
Start: 2024-06-09 | End: 2024-06-14

## 2024-06-09 RX ORDER — LIDOCAINE 50 MG/G
1 PATCH TOPICAL DAILY
Qty: 7 PATCH | Refills: 0 | Status: SHIPPED | OUTPATIENT
Start: 2024-06-09 | End: 2024-06-16

## 2024-06-09 NOTE — ED PROVIDER NOTES
History  Chief Complaint   Patient presents with    Neck Pain     Patient reports posterior neck pain ongoing due to poor posture and/or poor sleeping habits. States the pain is worsening, now radiating across neck into spine and shoulder blades and down left arm. Took robaxin without relief.      Patient complains of pain in the back of his neck has been ongoing for the past 3 weeks.  Getting progressively worse.  Now radiating to the left arm and collarbone region.  Also radiates down to the upper back.  Worse with certain movements.  States that when he sitting at the computer table it gets worse.  Took a over-the-counter medicine without relief.  Robaxin without any relief.  No IV drug abuse.  No known trauma.      History provided by:  Patient   used: No    Neck Pain  Pain location:  Generalized neck  Quality:  Aching  Pain radiates to:  L arm and L shoulder  Pain severity:  Mild  Pain is:  Same all the time  Onset quality:  Gradual  Duration:  3 weeks  Timing:  Constant  Progression:  Worsening  Chronicity:  New  Context: not lifting a heavy object and not pedestrian accident    Relieved by:  Nothing  Worsened by:  Position  Ineffective treatments:  NSAIDs, muscle relaxants and analgesics  Associated symptoms: no bladder incontinence, no bowel incontinence, no chest pain, no fever, no headaches, no paresis, no photophobia, no visual change, no weakness and no weight loss        Prior to Admission Medications   Prescriptions Last Dose Informant Patient Reported? Taking?   escitalopram (LEXAPRO) 10 mg tablet   No Yes   Sig: Take 1 tablet (10 mg total) by mouth daily      Facility-Administered Medications: None       Past Medical History:   Diagnosis Date    Asthma     Heat stroke     Sciatica        Past Surgical History:   Procedure Laterality Date    ANKLE SURGERY Left        Family History   Problem Relation Age of Onset    Hyperlipidemia Mother     Heart disease Father      I have  reviewed and agree with the history as documented.    E-Cigarette/Vaping    E-Cigarette Use Current Every Day User      E-Cigarette/Vaping Substances    Nicotine Yes     THC No     CBD No     Flavoring Yes     Other No     Unknown No      Social History     Tobacco Use    Smoking status: Every Day     Types: E-Cigarettes    Smokeless tobacco: Never   Vaping Use    Vaping status: Every Day    Substances: Nicotine, Flavoring   Substance Use Topics    Drug use: Never       Review of Systems   Constitutional:  Negative for chills, fever and weight loss.   HENT:  Negative for ear pain, hearing loss, sore throat, trouble swallowing and voice change.    Eyes:  Negative for photophobia, pain and discharge.   Respiratory:  Negative for cough, shortness of breath and wheezing.    Cardiovascular:  Negative for chest pain and palpitations.   Gastrointestinal:  Negative for abdominal pain, blood in stool, bowel incontinence, constipation, diarrhea, nausea and vomiting.   Genitourinary:  Negative for bladder incontinence, dysuria, flank pain, frequency and hematuria.   Musculoskeletal:  Positive for neck pain. Negative for joint swelling and neck stiffness.   Skin:  Negative for rash and wound.   Neurological:  Negative for dizziness, seizures, syncope, facial asymmetry, weakness and headaches.   Psychiatric/Behavioral:  Negative for hallucinations, self-injury and suicidal ideas.    All other systems reviewed and are negative.      Physical Exam  Physical Exam  Vitals and nursing note reviewed.   Constitutional:       General: He is not in acute distress.     Appearance: He is well-developed.   HENT:      Head: Normocephalic and atraumatic.      Right Ear: External ear normal.      Left Ear: External ear normal.   Eyes:      General: No scleral icterus.        Right eye: No discharge.         Left eye: No discharge.      Extraocular Movements: Extraocular movements intact.      Conjunctiva/sclera: Conjunctivae normal.    Cardiovascular:      Rate and Rhythm: Normal rate and regular rhythm.      Heart sounds: Normal heart sounds. No murmur heard.  Pulmonary:      Effort: Pulmonary effort is normal.      Breath sounds: Normal breath sounds. No wheezing or rales.   Abdominal:      General: Bowel sounds are normal. There is no distension.      Palpations: Abdomen is soft.      Tenderness: There is no abdominal tenderness. There is no guarding or rebound.   Musculoskeletal:         General: No deformity. Normal range of motion.      Cervical back: Normal range of motion and neck supple.   Skin:     General: Skin is warm and dry.      Findings: No rash.   Neurological:      General: No focal deficit present.      Mental Status: He is alert and oriented to person, place, and time.      Cranial Nerves: No cranial nerve deficit.   Psychiatric:         Mood and Affect: Mood normal.         Behavior: Behavior normal.         Thought Content: Thought content normal.         Judgment: Judgment normal.         Vital Signs  ED Triage Vitals [06/09/24 1151]   Temperature Pulse Respirations Blood Pressure SpO2   (!) 97.4 °F (36.3 °C) 75 20 (!) 147/101 99 %      Temp Source Heart Rate Source Patient Position - Orthostatic VS BP Location FiO2 (%)   Temporal Monitor Lying Left arm --      Pain Score       8           Vitals:    06/09/24 1151   BP: (!) 147/101   Pulse: 75   Patient Position - Orthostatic VS: Lying         Visual Acuity      ED Medications  Medications - No data to display    Diagnostic Studies  Results Reviewed       None                   XR cervical spine 2 or 3 views   ED Interpretation by Pancho Selby MD (06/09 1216)   Straightening of the cervical spine.  Possible spasm.                 Procedures  ECG 12 Lead Documentation Only    Date/Time: 6/9/2024 11:59 AM    Performed by: Pancho Selby MD  Authorized by: Pancho Selby MD    ECG reviewed by me, the ED Provider: yes    Patient location:  ED  Rate:     ECG rate:   65  Rhythm:     Rhythm: sinus rhythm    Ectopy:     Ectopy: none    QRS:     QRS axis:  Normal           ED Course  ED Course as of 06/09/24 1219   Sun Jun 09, 2024   1216 X-rays show straightening of the cervical spine.  Possible spasm.  No acute fracture.                               SBIRT 20yo+      Flowsheet Row Most Recent Value   Initial Alcohol Screen: US AUDIT-C     1. How often do you have a drink containing alcohol? 0 Filed at: 06/09/2024 1159   2. How many drinks containing alcohol do you have on a typical day you are drinking?  0 Filed at: 06/09/2024 1159   3a. Male UNDER 65: How often do you have five or more drinks on one occasion? 0 Filed at: 06/09/2024 1159   Audit-C Score 0 Filed at: 06/09/2024 1159   MU: How many times in the past year have you...    Used an illegal drug or used a prescription medication for non-medical reasons? Never Filed at: 06/09/2024 1159                      Medical Decision Making  Amount and/or Complexity of Data Reviewed  Radiology: ordered and independent interpretation performed.    Risk  Prescription drug management.             Disposition  Final diagnoses:   Strain of neck muscle, initial encounter     Time reflects when diagnosis was documented in both MDM as applicable and the Disposition within this note       Time User Action Codes Description Comment    6/9/2024 12:12 PM Pancho Selby Add [S16.1XXA] Strain of neck muscle, initial encounter           ED Disposition       ED Disposition   Discharge    Condition   Stable    Date/Time   Sun Jun 9, 2024 1212    Comment   Adam Brian discharge to home/self care.                   Follow-up Information       Follow up With Specialties Details Why Contact Info    Robert Budinetz, MD Family Medicine Call in 1 day  [address] [facility] [address] [phone]      Jeff Brennan MD Orthopedic Surgery, Sports Medicine Call in 1 day  [address]  [address]  [facility] [address] [phone]               Patient's Medications   Discharge Prescriptions    DIAZEPAM (VALIUM) 5 MG TABLET    Take 1 tablet (5 mg total) by mouth every 12 (twelve) hours as needed for muscle spasms for up to 5 days       Start Date: 6/9/2024  End Date: 6/14/2024       Order Dose: 5 mg       Quantity: 10 tablet    Refills: 0    LIDOCAINE (LIDODERM) 5 %    Apply 1 patch topically over 12 hours daily for 7 days Apply to neck region of pain Remove & Discard patch within 12 hours or as directed by MD       Start Date: 6/9/2024  End Date: 6/16/2024       Order Dose: 1 patch       Quantity: 7 patch    Refills: 0    NAPROXEN (NAPROSYN) 500 MG TABLET    Take 1 tablet (500 mg total) by mouth 2 (two) times a day with meals       Start Date: 6/9/2024  End Date: --       Order Dose: 500 mg       Quantity: 30 tablet    Refills: 0    PREDNISONE 20 MG TABLET    Take 2 tablets (40 mg total) by mouth daily for 5 days       Start Date: 6/9/2024  End Date: 6/14/2024       Order Dose: 40 mg       Quantity: 10 tablet    Refills: 0       No discharge procedures on file.    PDMP Review       None            ED Provider  Electronically Signed by             Pancho Selby MD  06/09/24 5775

## 2024-06-10 LAB
ATRIAL RATE: 65 BPM
P AXIS: 32 DEGREES
PR INTERVAL: 150 MS
QRS AXIS: 42 DEGREES
QRSD INTERVAL: 94 MS
QT INTERVAL: 396 MS
QTC INTERVAL: 411 MS
T WAVE AXIS: 50 DEGREES
VENTRICULAR RATE: 65 BPM

## 2024-08-04 ENCOUNTER — HOSPITAL ENCOUNTER (EMERGENCY)
Facility: HOSPITAL | Age: 44
Discharge: HOME | End: 2024-08-04
Attending: EMERGENCY MEDICINE
Payer: OTHER MISCELLANEOUS

## 2024-08-04 ENCOUNTER — APPOINTMENT (EMERGENCY)
Dept: RADIOLOGY | Facility: HOSPITAL | Age: 44
End: 2024-08-04
Payer: OTHER MISCELLANEOUS

## 2024-08-04 VITALS
DIASTOLIC BLOOD PRESSURE: 72 MMHG | SYSTOLIC BLOOD PRESSURE: 148 MMHG | RESPIRATION RATE: 18 BRPM | WEIGHT: 202.38 LBS | OXYGEN SATURATION: 99 % | HEART RATE: 77 BPM | TEMPERATURE: 98.1 F | BODY MASS INDEX: 29.89 KG/M2

## 2024-08-04 DIAGNOSIS — S39.012A STRAIN OF LUMBAR REGION, INITIAL ENCOUNTER: Primary | ICD-10-CM

## 2024-08-04 PROCEDURE — 72100 X-RAY EXAM L-S SPINE 2/3 VWS: CPT

## 2024-08-04 PROCEDURE — 99283 EMERGENCY DEPT VISIT LOW MDM: CPT

## 2024-08-04 PROCEDURE — 99284 EMERGENCY DEPT VISIT MOD MDM: CPT | Performed by: EMERGENCY MEDICINE

## 2024-08-04 PROCEDURE — 96372 THER/PROPH/DIAG INJ SC/IM: CPT

## 2024-08-04 RX ORDER — KETOROLAC TROMETHAMINE 30 MG/ML
15 INJECTION, SOLUTION INTRAMUSCULAR; INTRAVENOUS ONCE
Status: COMPLETED | OUTPATIENT
Start: 2024-08-04 | End: 2024-08-04

## 2024-08-04 RX ADMIN — KETOROLAC TROMETHAMINE 15 MG: 30 INJECTION, SOLUTION INTRAMUSCULAR at 17:42

## 2024-08-04 NOTE — DISCHARGE INSTRUCTIONS
Take ibuprofen or Aleve for pain and discomfort.  You may also use over-the-counter lidocaine patches.  You may also use warm compresses or heating pad 4-6 times a day.  Do not use the warm compresses or heating pad with the lidocaine patches.  Follow-up with your occupational health tomorrow as discussed.    Your imaging studies have been preliminarily reviewed by the emergency department.  Further review by Radiology is pending at this time.  If there is a discrepancy or a finding of additional concern identified, we will attempt to contact you at the number you have provided us.  If you do not hear from us, follow-up with your primary care provider within 1-2 weeks is always recommended to ensure that all findings were normal or as initially reported.  Your results may also be available on [email or web address] [email or web address]    Please also note that sometimes there are subtle abnormalities in your lab values that you may observe when you access your record online.  These are frequently not worrisome and if they are of concern we will have discussed them with you.  However, we always encourage that you discuss any concerns you may have or observe on your record with your primary care provider.  Please also note that while your visit documentation was reviewed prior to completion, voice transcription will occasionally recognize words or grammar differently than what was spoken.

## 2024-08-04 NOTE — ED PROVIDER NOTES
History  Chief Complaint   Patient presents with    Back Pain     WC pt stated he was pulling something at work today when it got stuck and he felt his lower back jolt, pt has 6/10 pain      Patient is a 43-year-old male presenting to emergency room with chief complaint of back pain.  Patient reports that he was at work pulling an object when he had sudden onset of discomfort in his lower back.  Patient reports that it is normally a object that needs to be slid off another handing but that one of the legs became lodged at the edge and when it stuck it andrey his back.  Patient was referred to the emergency room by occupational health.    Patient has no history of cancer.  Is on no immunosuppressive therapy, chronic steroid use or hx of diabetes.  No recent fevers or chills.  No abdominal pain.  No hematuria or other urinary complaints.  No anticoagulation therapy.  No anti-platelet therapy.  No recent trauma.  No use of intravenous drugs or medications.  No recent back surgery.  No bowel or bladder incontinence.        History provided by:  Patient  Back Pain  Location:  Lumbar spine  Associated symptoms: no bladder incontinence, no bowel incontinence, no chest pain and no dysuria        Prior to Admission Medications   Prescriptions Last Dose Informant Patient Reported? Taking?   escitalopram (LEXAPRO) 10 mg tablet   No Yes   Sig: Take 1 tablet (10 mg total) by mouth daily   lidocaine (Lidoderm) 5 %   No Yes   Sig: Apply 1 patch topically over 12 hours daily for 7 days Apply to neck region of pain Remove & Discard patch within 12 hours or as directed by MD   naproxen (Naprosyn) 500 mg tablet   No Yes   Sig: Take 1 tablet (500 mg total) by mouth 2 (two) times a day with meals      Facility-Administered Medications: None       Past Medical History:   Diagnosis Date    Asthma     Heat stroke     Sciatica        Past Surgical History:   Procedure Laterality Date    ANKLE SURGERY Left        Family History   Problem  Relation Age of Onset    Hyperlipidemia Mother     Heart disease Father      I have reviewed and agree with the history as documented.    E-Cigarette/Vaping    E-Cigarette Use Current Every Day User      E-Cigarette/Vaping Substances    Nicotine Yes     THC No     CBD No     Flavoring Yes     Other No     Unknown No      Social History     Tobacco Use    Smoking status: Every Day     Types: E-Cigarettes    Smokeless tobacco: Never   Vaping Use    Vaping status: Every Day    Substances: Nicotine, Flavoring   Substance Use Topics    Alcohol use: Yes     Comment: occasional    Drug use: Never       Review of Systems   Cardiovascular:  Negative for chest pain.   Gastrointestinal:  Negative for bowel incontinence, diarrhea, nausea and vomiting.   Genitourinary:  Negative for bladder incontinence, dysuria, frequency and urgency.   Musculoskeletal:  Positive for back pain.       Physical Exam  Physical Exam  Vitals and nursing note reviewed.   Constitutional:       General: He is not in acute distress.     Appearance: He is normal weight. He is not ill-appearing or toxic-appearing.   HENT:      Head: Normocephalic and atraumatic.      Right Ear: External ear normal.      Left Ear: External ear normal.      Nose: Nose normal.      Mouth/Throat:      Mouth: Mucous membranes are moist.   Abdominal:      General: Abdomen is flat. There is no distension.   Musculoskeletal:         General: Tenderness present. No signs of injury.      Thoracic back: Tenderness present.      Lumbar back: Tenderness present. No spasms or bony tenderness. Decreased range of motion.        Back:    Skin:     Coloration: Skin is not pale.   Neurological:      General: No focal deficit present.      Mental Status: He is alert.         Vital Signs  ED Triage Vitals [08/04/24 1724]   Temperature Pulse Respirations Blood Pressure SpO2   98.1 °F (36.7 °C) 77 18 148/72 99 %      Temp Source Heart Rate Source Patient Position - Orthostatic VS BP Location  FiO2 (%)   Temporal Monitor Lying Right arm --      Pain Score       6           Vitals:    08/04/24 1724   BP: 148/72   Pulse: 77   Patient Position - Orthostatic VS: Lying         Visual Acuity      ED Medications  Medications   ketorolac (TORADOL) injection 15 mg (15 mg Intramuscular Given 8/4/24 1742)       Diagnostic Studies  Results Reviewed       None                   XR spine lumbar 2 or 3 views injury   ED Interpretation by Jose Daniel Rhodes DO (08/04 1801)   No fracture                 Procedures  Procedures         ED Course  ED Course as of 08/04/24 1850   Sun Aug 04, 2024   1728 Patient reporting that his employer request x-rays.  I did advise patient that in this clinical setting that x-rays would be unlikely to add any valuable information.  His pain is consistent with a musculoskeletal pull   1735 Patient has clinical exam consistent with musculoskeletal back pain.  Patient was provided medications for relief.  He is instructed to follow-up with occupational health.                                 SBIRT 22yo+      Flowsheet Row Most Recent Value   Initial Alcohol Screen: US AUDIT-C     1. How often do you have a drink containing alcohol? 3 Filed at: 08/04/2024 1726   2. How many drinks containing alcohol do you have on a typical day you are drinking?  1 Filed at: 08/04/2024 1726   3a. Male UNDER 65: How often do you have five or more drinks on one occasion? 0 Filed at: 08/04/2024 1726   3b. FEMALE Any Age, or MALE 65+: How often do you have 4 or more drinks on one occassion? 0 Filed at: 08/04/2024 1726   Audit-C Score 4 Filed at: 08/04/2024 1726   MU: How many times in the past year have you...    Used an illegal drug or used a prescription medication for non-medical reasons? Never Filed at: 08/04/2024 1726                      Medical Decision Making  Patient presented to the emergency department and a MSE was performed. The patient was evaluated for complaint related to acute back pain. Patient  is potentially at risk for, but not limited to, musculoskeletal pain, sciatica, degenerative disc disease, traumatic injury, occult fracture, discitis, osteomyelitis, spinal cord abscess  or other infectious etiology, spinal cord hemorrhage, conus medullaris, or cauda equina syndrome.  Several of these diagnoses have been evaluated and ruled out by history and physical.  As needed, patient will be further evaluated with laboratory and imaging studies.  Higher level diagnostics, such as CT imaging or ultrasound, may also be required.  Please see work-up portion of the note for further evaluation of patient's risk.  Socioeconomic factors were also considered as part of the decision-making process.  Unless otherwise stated in the chart or patient is admitted as elsewhere documented, any previously prescribed medications will be maintained.            Problems Addressed:  Strain of lumbar region, initial encounter: complicated acute illness or injury    Amount and/or Complexity of Data Reviewed  Radiology: ordered and independent interpretation performed.    Risk  Prescription drug management.                 Disposition  Final diagnoses:   Strain of lumbar region, initial encounter     Time reflects when diagnosis was documented in both MDM as applicable and the Disposition within this note       Time User Action Codes Description Comment    8/4/2024  5:28 PM Jose Daniel Rhodes Add [S39.012A] Strain of lumbar region, initial encounter           ED Disposition       ED Disposition   Discharge    Condition   Stable    Date/Time   Sun Aug 4, 2024 2480    Comment   Adam Brian discharge to home/self care.                   Follow-up Information       Follow up With Specialties Details Why Contact Info    Your occupational health at [facility]  In 1 day              Discharge Medication List as of 8/4/2024  5:38 PM        CONTINUE these medications which have NOT CHANGED    Details   escitalopram (LEXAPRO)  10 mg tablet Take 1 tablet (10 mg total) by mouth daily, Starting Tue 4/30/2024, Until Sun 10/27/2024, Normal      lidocaine (Lidoderm) 5 % Apply 1 patch topically over 12 hours daily for 7 days Apply to neck region of pain Remove & Discard patch within 12 hours or as directed by MD, Starting Sun 6/9/2024, Until Sun 8/4/2024, Normal      naproxen (Naprosyn) 500 mg tablet Take 1 tablet (500 mg total) by mouth 2 (two) times a day with meals, Starting Sun 6/9/2024, Normal             No discharge procedures on file.    PDMP Review       None            ED Provider  Electronically Signed by             Jose Daniel Rhodes DO  08/04/24 0572

## 2024-08-04 NOTE — Clinical Note
Adam Brian was seen and treated in our emergency department on 8/4/2024.    Occupational Medicine Follow Up Within 24 hours            Diagnosis:     Adam  may return to work on return date.    He may return on this date: 08/05/2024    Patient should be cleared by occupational health to return to full duties     If you have any questions or concerns, please don't hesitate to call.      Jose Daniel Rhodes, DO    ______________________________           _______________          _______________  Hospital Representative                              Date                                Time

## 2025-05-27 ENCOUNTER — TELEPHONE (OUTPATIENT)
Dept: FAMILY MEDICINE CLINIC | Facility: CLINIC | Age: 45
End: 2025-05-27

## 2025-05-27 NOTE — TELEPHONE ENCOUNTER
Patient overdue for visit     Needs to schedule visit. Last office visit 4/30/2024    Mail sent to patient   Copy scanned into media

## 2025-08-01 ENCOUNTER — TELEPHONE (OUTPATIENT)
Dept: FAMILY MEDICINE CLINIC | Facility: CLINIC | Age: 45
End: 2025-08-01